# Patient Record
Sex: FEMALE | URBAN - METROPOLITAN AREA
[De-identification: names, ages, dates, MRNs, and addresses within clinical notes are randomized per-mention and may not be internally consistent; named-entity substitution may affect disease eponyms.]

---

## 2023-08-31 ENCOUNTER — APPOINTMENT (OUTPATIENT)
Dept: URBAN - METROPOLITAN AREA SURGERY 11 | Age: 83
Setting detail: DERMATOLOGY
End: 2023-08-31

## 2023-08-31 PROBLEM — D48.5 NEOPLASM OF UNCERTAIN BEHAVIOR OF SKIN: Status: ACTIVE | Noted: 2023-08-31

## 2023-08-31 PROCEDURE — OTHER MIPS QUALITY: OTHER

## 2023-08-31 PROCEDURE — OTHER BIOPSY BY SHAVE METHOD: OTHER

## 2023-08-31 PROCEDURE — 11102 TANGNTL BX SKIN SINGLE LES: CPT

## 2023-09-13 ENCOUNTER — APPOINTMENT (OUTPATIENT)
Dept: URBAN - METROPOLITAN AREA SURGERY 11 | Age: 83
Setting detail: DERMATOLOGY
End: 2023-09-13

## 2023-09-13 PROBLEM — C44.92 SQUAMOUS CELL CARCINOMA OF SKIN, UNSPECIFIED: Status: ACTIVE | Noted: 2023-09-13

## 2023-09-13 PROCEDURE — OTHER MOHS SURGERY PHONE CONSULTATION: OTHER

## 2023-09-14 ENCOUNTER — APPOINTMENT (OUTPATIENT)
Dept: URBAN - METROPOLITAN AREA SURGERY 11 | Age: 83
Setting detail: DERMATOLOGY
End: 2023-09-19

## 2023-09-14 PROBLEM — C44.729 SQUAMOUS CELL CARCINOMA OF SKIN OF LEFT LOWER LIMB, INCLUDING HIP: Status: ACTIVE | Noted: 2023-09-14

## 2023-09-14 PROCEDURE — OTHER MOHS SURGERY: OTHER

## 2023-09-14 PROCEDURE — 17313 MOHS 1 STAGE T/A/L: CPT

## 2023-09-14 PROCEDURE — 15271 SKIN SUB GRAFT TRNK/ARM/LEG: CPT

## 2023-09-14 PROCEDURE — OTHER KERECIS APPLICATION: OTHER

## 2023-09-14 NOTE — PROCEDURE: KERECIS APPLICATION
Include The Following Details In The Note (If No Details Will Only Be Reflected In The Surgical Fax): No
Epidermal Closure: simple interrupted
Wound Care: Petrolatum
Graft Type: Skin Substitute
Skin Substitute Units (Will Override Primary Defect Units If Greater Than 0): 11
Anesthesia Volume In Cc: 0
Dressing: dry sterile dressing
Estimated Blood Loss (Cc): minimal
Primary Defect Length (In Cm): 3
Repair Type: Graft
Tarsorrhaphy Text: A tarsorrhaphy was performed using Frost sutures.
Consent: The rationale for Repairs was explained to the patient and consent was obtained. The risks, benefits and alternatives to therapy were discussed in detail. Specifically, the risks of infection, scarring, bleeding, prolonged wound healing, incomplete removal, allergy to anesthesia, nerve injury and recurrence were addressed. Prior to the procedure, the treatment site was clearly identified and confirmed by the patient. All components of Universal Protocol/PAUSE Rule completed.
Post-Care Instructions: I reviewed with the patient in detail post-care instructions. Patient is to keep the bandage dry and not disrupt the dressing until they return in 1 week.  Patient can remove the dressing and shower the day of appointment in one week if desired.  Should the patient develop any fevers, chills, bleeding, severe pain patient will contact the office immediately.
Kerecis Text: Given the location of the defect and shape of the defect, a skin substitute graft was deemed most appropriate.  The defect edges were debrided with gauze scrub and/or light curettage.  The graft was then placed in the primary defect and oriented appropriately.
Primary Defect Width (In Cm): 2.4
Detail Level: Detailed
Skin Substitute: Kerecis

## 2023-09-14 NOTE — PROCEDURE: MOHS SURGERY
Date Of Previous Biopsy (Optional): [id number]. 8/31/2023 Date Of Previous Biopsy (Optional): [id number]. 8/31/2023

## 2023-09-14 NOTE — PROCEDURE: MOHS SURGERY
decreased ability to use arms for pushing/pulling/impaired ability to control trunk for mobility decreased ability to use legs for bridging/pushing No Residual Tumor Seen Histology Text: There were no malignant cells seen in the sections examined.

## 2023-09-21 ENCOUNTER — APPOINTMENT (OUTPATIENT)
Dept: URBAN - METROPOLITAN AREA SURGERY 11 | Age: 83
Setting detail: DERMATOLOGY
End: 2023-09-28

## 2023-09-21 PROBLEM — C44.729 SQUAMOUS CELL CARCINOMA OF SKIN OF LEFT LOWER LIMB, INCLUDING HIP: Status: ACTIVE | Noted: 2023-09-21

## 2023-09-21 PROCEDURE — OTHER REPAIR NOTE: OTHER

## 2023-09-21 PROCEDURE — 15271 SKIN SUB GRAFT TRNK/ARM/LEG: CPT

## 2023-09-21 NOTE — PROCEDURE: REPAIR NOTE
Skin Substitute Text: Given the location of the defect and shape of the defect,  a skin substitute graft was deemed most appropriate.  The defect edges were debrided with gauze scrub and/or curretage.  The graft material was then placed in the primary defect and oriented appropriately.

## 2023-09-21 NOTE — PROCEDURE: REPAIR NOTE
Post-Care Instructions: I reviewed with the patient in detail post-care instructions. Patient is to keep the bandage dry and not disrupt the dressing until they return in 1 week.  Patient can remove the dressing and shower the day of appointment in one week if desired.  Should the patient develop any fevers, chills, bleeding, severe pain patient will contact the office immediately.

## 2023-09-21 NOTE — PROCEDURE: REPAIR NOTE
Optional Repair Type Statement (Will Appear After The Structured Text Of The Repair-Graft-Flap Type): Entire piece of Artacent AC was used.

## 2023-09-28 ENCOUNTER — APPOINTMENT (OUTPATIENT)
Dept: URBAN - METROPOLITAN AREA SURGERY 11 | Age: 83
Setting detail: DERMATOLOGY
End: 2023-09-28

## 2023-09-28 PROBLEM — C44.729 SQUAMOUS CELL CARCINOMA OF SKIN OF LEFT LOWER LIMB, INCLUDING HIP: Status: ACTIVE | Noted: 2023-09-28

## 2023-09-28 PROCEDURE — OTHER REPAIR NOTE: OTHER

## 2023-09-28 PROCEDURE — 15271 SKIN SUB GRAFT TRNK/ARM/LEG: CPT

## 2023-10-05 ENCOUNTER — APPOINTMENT (OUTPATIENT)
Dept: URBAN - METROPOLITAN AREA SURGERY 11 | Age: 83
Setting detail: DERMATOLOGY
End: 2023-10-09

## 2023-10-05 DIAGNOSIS — S0182XA OPEN WOUND OF OTHER AND MULTIPLE SITES OF FACE, COMPLICATED: ICD-10-CM

## 2023-10-05 PROBLEM — S81.802A UNSPECIFIED OPEN WOUND, LEFT LOWER LEG, INITIAL ENCOUNTER: Status: ACTIVE | Noted: 2023-10-05

## 2023-10-05 PROCEDURE — 15271 SKIN SUB GRAFT TRNK/ARM/LEG: CPT

## 2023-10-05 PROCEDURE — OTHER REPAIR NOTE: OTHER

## 2023-10-05 ASSESSMENT — LOCATION DETAILED DESCRIPTION DERM: LOCATION DETAILED: LEFT LATERAL DISTAL PRETIBIAL REGION

## 2023-10-05 ASSESSMENT — LOCATION SIMPLE DESCRIPTION DERM: LOCATION SIMPLE: LEFT PRETIBIAL REGION

## 2023-10-05 ASSESSMENT — LOCATION ZONE DERM: LOCATION ZONE: LEG

## 2023-10-05 NOTE — HPI: WOUND CHECK (POST-OP)
Date Of Procedure: 9/14/23
Additional History: \\n9/14/23 - Kerecis applied \\n9/21/23 - Artacent #1 applied \\n9/28/23 - Artacent #2 applied

## 2023-10-16 ENCOUNTER — APPOINTMENT (OUTPATIENT)
Dept: URBAN - METROPOLITAN AREA SURGERY 11 | Age: 83
Setting detail: DERMATOLOGY
End: 2023-10-20

## 2023-10-16 DIAGNOSIS — L92.8 OTHER GRANULOMATOUS DISORDERS OF THE SKIN AND SUBCUTANEOUS TISSUE: ICD-10-CM

## 2023-10-16 PROCEDURE — OTHER CHEMICAL CAUTERIZATION: OTHER

## 2023-10-16 PROCEDURE — 17250 CHEM CAUT OF GRANLTJ TISSUE: CPT

## 2023-10-16 ASSESSMENT — LOCATION ZONE DERM: LOCATION ZONE: LEG

## 2023-10-16 ASSESSMENT — LOCATION DETAILED DESCRIPTION DERM: LOCATION DETAILED: LEFT LATERAL DISTAL PRETIBIAL REGION

## 2023-10-16 ASSESSMENT — LOCATION SIMPLE DESCRIPTION DERM: LOCATION SIMPLE: LEFT PRETIBIAL REGION

## 2023-10-30 ENCOUNTER — APPOINTMENT (OUTPATIENT)
Dept: URBAN - METROPOLITAN AREA SURGERY 11 | Age: 83
Setting detail: DERMATOLOGY
End: 2023-11-02

## 2023-10-30 DIAGNOSIS — S0182XA OPEN WOUND OF OTHER AND MULTIPLE SITES OF FACE, COMPLICATED: ICD-10-CM

## 2023-10-30 DIAGNOSIS — L92.8 OTHER GRANULOMATOUS DISORDERS OF THE SKIN AND SUBCUTANEOUS TISSUE: ICD-10-CM

## 2023-10-30 PROBLEM — S81.802A UNSPECIFIED OPEN WOUND, LEFT LOWER LEG, INITIAL ENCOUNTER: Status: ACTIVE | Noted: 2023-10-30

## 2023-10-30 PROCEDURE — 17250 CHEM CAUT OF GRANLTJ TISSUE: CPT

## 2023-10-30 PROCEDURE — OTHER POST-OP WOUND CHECK: OTHER

## 2023-10-30 PROCEDURE — OTHER CHEMICAL CAUTERIZATION: OTHER

## 2023-10-30 ASSESSMENT — LOCATION SIMPLE DESCRIPTION DERM: LOCATION SIMPLE: LEFT PRETIBIAL REGION

## 2023-10-30 ASSESSMENT — LOCATION ZONE DERM: LOCATION ZONE: LEG

## 2023-10-30 ASSESSMENT — LOCATION DETAILED DESCRIPTION DERM: LOCATION DETAILED: LEFT LATERAL DISTAL PRETIBIAL REGION

## 2023-10-30 NOTE — PROCEDURE: POST-OP WOUND CHECK
Detail Level: Detailed
Add 18765 Cpt? (Important Note: In 2017 The Use Of 39635 Is Being Tracked By Cms To Determine Future Global Period Reimbursement For Global Periods): no
Additional Comments: Wound cleaned with vinegar/ water solution. Silver nitrate to wound. Pt to continue wound care at home and f/u in 3 weeks on post op schedule.

## 2023-11-16 ENCOUNTER — APPOINTMENT (OUTPATIENT)
Dept: URBAN - METROPOLITAN AREA SURGERY 11 | Age: 83
Setting detail: DERMATOLOGY
End: 2023-11-19

## 2023-11-16 DIAGNOSIS — Z48.817 ENCOUNTER FOR SURGICAL AFTERCARE FOLLOWING SURGERY ON THE SKIN AND SUBCUTANEOUS TISSUE: ICD-10-CM

## 2023-11-16 PROCEDURE — OTHER POST-OP WOUND CHECK: OTHER

## 2023-11-16 ASSESSMENT — LOCATION SIMPLE DESCRIPTION DERM: LOCATION SIMPLE: LEFT PRETIBIAL REGION

## 2023-11-16 ASSESSMENT — LOCATION DETAILED DESCRIPTION DERM: LOCATION DETAILED: LEFT LATERAL PROXIMAL PRETIBIAL REGION

## 2023-11-16 ASSESSMENT — LOCATION ZONE DERM: LOCATION ZONE: LEG

## 2023-11-16 NOTE — PROCEDURE: POST-OP WOUND CHECK
Detail Level: Detailed
Add 15237 Cpt? (Important Note: In 2017 The Use Of 08304 Is Being Tracked By Cms To Determine Future Global Period Reimbursement For Global Periods): no
Wound Evaluated By: Matilde Baker MA
Additional Comments: Cleaned with 1:1 vinegar water. Applied vaseline. Recommended continue vinegar water soaks daily and applying vaseline BID x2 more weeks. Schedule FBSE in 3 months.

## 2024-04-10 NOTE — PROCEDURE: REPAIR NOTE
FYI   Crescentic Advancement Flap Text: The defect edges were debeveled with a #15 scalpel blade. Given the location of the defect and the proximity to free margins a crescentic advancement flap was deemed most appropriate. Using a sterile surgical marker, the appropriate advancement flap was drawn incorporating the defect and placing the expected incisions within the relaxed skin tension lines where possible. The area thus outlined was incised deep to adipose tissue with a #15 scalpel blade. The skin margins were undermined to an appropriate distance in all directions utilizing iris scissors. Following this, the designed flap was advanced and carried over into the primary defect and sutured into place.
